# Patient Record
Sex: FEMALE | Race: WHITE
[De-identification: names, ages, dates, MRNs, and addresses within clinical notes are randomized per-mention and may not be internally consistent; named-entity substitution may affect disease eponyms.]

---

## 2019-01-11 ENCOUNTER — HOSPITAL ENCOUNTER (OUTPATIENT)
Dept: HOSPITAL 61 - KCIC US | Age: 24
Discharge: HOME | End: 2019-01-11
Attending: MIDWIFE
Payer: COMMERCIAL

## 2019-01-11 DIAGNOSIS — Z3A.20: ICD-10-CM

## 2019-01-11 PROCEDURE — 76805 OB US >/= 14 WKS SNGL FETUS: CPT

## 2019-01-11 NOTE — [facility]
PREG MORE THAN OR EQ TO 14 WKS

 

History: Routine mid pregnancy anatomy scan

 

Comparison: None.

 

Findings:

Multiple sonographic images of the pregnant uterus are submitted. There is

a single intrauterine fetus in breech presentation. There is anterior 

placenta. Cervix measured 3.8 cm. There is demonstrable fetal cardiac 

activity 140 bpm. Amniotic fluid volume is within normal limits, estimated

VANI about 14.5 cm. There is no demonstrable abnormality of the visualized 

fetal spine. There is visualization of fetal stomach, fetal bladder, 

midline nose/lips. There is four-chamber view of the heart. 2 fetal 

kidneys were visualized. 2 upper and lower extremities were visualized. 

Fetal movement was noted by technologist. There is no abnormality of the 

visualized fetal brain. There is midline cord insertion, apparently 

three-vessel cord. Maternal adnexal regions are not demonstrated.

 

Biometry data are as follows:

 

Biparietal diameter 5.03 cm corresponds 21 weeks 2 days, 93rd percentile

Head circumference 17.99 cm corresponds with 20 weeks 3 days, 69th 

percentile

Abdominal circumference 15.71 cm corresponds with 20 weeks 6 days, 77th 

percentile

Femur length 3.12 cm corresponds with 19 weeks 5 days, 36 percentile

Adjusted ultrasound age 20 weeks 4 days with estimated delivery date of 

5/27/2019. LMP date 19 weeks 6 days with estimated delivery date of 

6/1/2019.

Estimated fetal weight 349 g +/- 52 g

HC/AC ratio within normal limits 1.15

 

Impression: 

 

1.  There is a single viable intrauterine fetus in breech presentation, 

adjusted ultrasound age 20 weeks 4 days with estimated delivery date by 

ultrasound of 5/27/2019. Of the visualized fetal anatomy, no significant 

abnormality is demonstrated.

 

Electronically signed by: Leonidas Bartlett MD (1/11/2019 4:41 PM) 

[id number]